# Patient Record
Sex: MALE | Race: WHITE
[De-identification: names, ages, dates, MRNs, and addresses within clinical notes are randomized per-mention and may not be internally consistent; named-entity substitution may affect disease eponyms.]

---

## 2017-06-29 ENCOUNTER — HOSPITAL ENCOUNTER (OUTPATIENT)
Dept: HOSPITAL 45 - C.LABSPEC | Age: 72
Discharge: HOME | End: 2017-06-29
Attending: PHYSICIAN ASSISTANT
Payer: COMMERCIAL

## 2017-06-29 DIAGNOSIS — M25.421: Primary | ICD-10-CM

## 2017-06-29 LAB
APPEARANCE SNV: (no result)
COLOR SNV: (no result)
SYNOVIAL FLUID MONONUC RELAT: 54.4 %
SYNOVIAL FLUID POLYNUC RELAT: 45.6 %

## 2017-08-11 ENCOUNTER — HOSPITAL ENCOUNTER (OUTPATIENT)
Dept: HOSPITAL 45 - C.LABBC | Age: 72
Discharge: HOME | End: 2017-08-11
Attending: UROLOGY
Payer: COMMERCIAL

## 2017-08-11 DIAGNOSIS — N52.9: Primary | ICD-10-CM

## 2017-08-11 DIAGNOSIS — N40.1: ICD-10-CM

## 2017-08-17 ENCOUNTER — HOSPITAL ENCOUNTER (OUTPATIENT)
Dept: HOSPITAL 45 - C.RADBC | Age: 72
Discharge: HOME | End: 2017-08-17
Attending: UROLOGY
Payer: COMMERCIAL

## 2017-08-17 DIAGNOSIS — N52.9: Primary | ICD-10-CM

## 2017-08-17 NOTE — DIAGNOSTIC IMAGING REPORT
K[facility]



CLINICAL HISTORY: Benign prostatic hypertrophy. Erectile dysfunction.



FINDINGS: 2 AP supine abdominal radiograph are compared to study dated

8/12/2016. There is a nonobstructed abdominal bowel gas pattern. No abnormal

abdominal calcifications are identified. Phleboliths are observed in the pelvis.

The skeletal structures are osteopenic. Mild lumbosacral spondylosis is

observed. The bony pelvis appears intact. Advanced arthritic change is seen in

the right hip.



IMPRESSION:



1. Nonobstructed abdominal bowel gas pattern.



2. There is no radiographic evidence of nephrolithiasis.







Electronically signed by:  Hernando Moreira M.D.

8/17/2017 3:59 PM



Dictated Date/Time:  8/17/2017 3:57 PM

## 2017-08-17 NOTE — CODING QUERY MEDICAL NECESSITY
CQSUPPORTING DIAGNOSIS NEEDED





A supporting diagnosis is required for the test/procedure performed on this patient in 
order for us to be reimbursed by the patient's insurance. Please provide a supporting 
diagnosis for the following test/procedure listed below next to the test name along with 
your signature. 



*If there is no additional diagnosis for this patient that would support the following 
test/procedure please document that below next to the test/procedure.



Test(s)/Procedure(s) that require a supporting diagnosis:







DOS  08/11/17    PROSTATE SPECIFIC ANTIGEN









Provider Signature:  ______________________________  Date:  _______



Thank you  

Ying Hawthorne

[employer]

Phone:  [phone]

Fax:  [phone]



Once completed, please kindly fax back to [phone]



For questions please call [phone]

## 2018-01-02 ENCOUNTER — HOSPITAL ENCOUNTER (OUTPATIENT)
Dept: HOSPITAL 45 - C.LABBC | Age: 73
Discharge: HOME | End: 2018-01-02
Attending: INTERNAL MEDICINE
Payer: COMMERCIAL

## 2018-01-02 DIAGNOSIS — E78.5: Primary | ICD-10-CM

## 2018-01-02 DIAGNOSIS — M16.9: ICD-10-CM

## 2018-01-02 DIAGNOSIS — N40.1: Primary | ICD-10-CM

## 2018-01-02 DIAGNOSIS — N40.1: ICD-10-CM

## 2018-01-02 DIAGNOSIS — N20.0: ICD-10-CM

## 2018-01-02 LAB
ALBUMIN SERPL-MCNC: 3.6 GM/DL (ref 3.4–5)
ALP SERPL-CCNC: 76 U/L (ref 45–117)
ALT SERPL-CCNC: 29 U/L (ref 12–78)
AST SERPL-CCNC: 20 U/L (ref 15–37)
BUN SERPL-MCNC: 22 MG/DL (ref 7–18)
CALCIUM SERPL-MCNC: 8.8 MG/DL (ref 8.5–10.1)
CO2 SERPL-SCNC: 29 MMOL/L (ref 21–32)
CREAT SERPL-MCNC: 1.28 MG/DL (ref 0.6–1.4)
GLUCOSE SERPL-MCNC: 93 MG/DL (ref 70–99)
KETONES UR QL STRIP: 136 MG/DL
PH UR: 206 MG/DL (ref 0–200)
POTASSIUM SERPL-SCNC: 3.9 MMOL/L (ref 3.5–5.1)
PROT SERPL-MCNC: 6.9 GM/DL (ref 6.4–8.2)
SODIUM SERPL-SCNC: 141 MMOL/L (ref 136–145)

## 2018-01-05 ENCOUNTER — HOSPITAL ENCOUNTER (OUTPATIENT)
Dept: HOSPITAL 45 - C.LABSPEC | Age: 73
Discharge: HOME | End: 2018-01-05
Attending: UROLOGY
Payer: COMMERCIAL

## 2018-01-05 DIAGNOSIS — R31.29: Primary | ICD-10-CM

## 2018-01-29 ENCOUNTER — HOSPITAL ENCOUNTER (OUTPATIENT)
Dept: HOSPITAL 45 - C.ULTR | Age: 73
Discharge: HOME | End: 2018-01-29
Attending: UROLOGY
Payer: COMMERCIAL

## 2018-01-29 DIAGNOSIS — N28.89: ICD-10-CM

## 2018-01-29 DIAGNOSIS — N28.1: ICD-10-CM

## 2018-01-29 DIAGNOSIS — N20.0: Primary | ICD-10-CM

## 2018-01-29 NOTE — DIAGNOSTIC IMAGING REPORT
RENAL ULTRASOUND



CLINICAL HISTORY: Nephrolithiasis.    



COMPARISON STUDY:  Renal ultrasound May 29, 2013, CT of the abdomen and pelvis

May 24, 2015 and KUB August 17, 2017.



TECHNIQUE:  Sonography of the kidneys and the urinary bladder was performed.



FINDINGS: The right kidney measures 11.4 cm in maximal dimension and the left

measures 11.6 cm. Note is made of a 4.5 cm cyst arising from the midpole of the

right kidney. No calculi are identified by sonography. There is no

hydronephrosis. A right-sided parapelvic cyst within the lower pole is noted.

There is moderate renal cortical thinning. Both ureteral jets were identified.



IMPRESSION: 



1. No hydronephrosis. No calculi identified by sonography.



2. 4.5 cm right renal cyst.



3. Moderate bilateral renal cortical thinning.







Electronically signed by:  Pablo Biggs M.D.

1/29/2018 10:25 AM



Dictated Date/Time:  1/29/2018 10:14 AM

## 2018-07-20 ENCOUNTER — HOSPITAL ENCOUNTER (OUTPATIENT)
Dept: HOSPITAL 45 - C.MRIBC | Age: 73
Discharge: HOME | End: 2018-07-20
Attending: INTERNAL MEDICINE
Payer: COMMERCIAL

## 2018-07-20 DIAGNOSIS — M54.16: Primary | ICD-10-CM

## 2018-07-20 NOTE — DIAGNOSTIC IMAGING REPORT
MRI OF THE LUMBAR SPINE WITHOUT CONTRAST



CLINICAL HISTORY: Back pain and left-sided radiculopathy.    



COMPARISON STUDY:  Lumbar spine radiographs July 5, 2018. 



TECHNIQUE:  Utilizing a 1.5 Dona magnet and dedicated coil, multiplanar,

multiecho imaging of the lumbar spine was performed without IV contrast.





FINDINGS:



For purposes of numbering on this exam, the L5-S1 disc space is assigned to

axial image 28 of 30. Note is made of a 4 mm anterolisthesis of L4 on L5 due to

facet arthrosis. Alignment is otherwise anatomic with the exception of slight

retrolisthesis of L2 on L3. There is no suspicious marrow replacement. Note is

made of a few hemangiomas within the lumbar spine, including a 1.7 cm hemangioma

within the L1 vertebral body. The conus terminates at the upper L1 level. Note

is made of a 1 x 0.8 cm T1 and T2 hyperintense abnormality within the right

aspect of the canal at the L3-L4 level which can indicates with the facet joint.

This reflects a synovial cyst. No additional intracanalicular lesions are

present. A right renal cyst is noted, as shown on ultrasound of January 29, 2018.



L1-2: The central canal and neural foramen are patent.



L2-3: There is moderate disc space narrowing with disc bulge, ligamentous

hypertrophy and facet arthrosis that result in mild narrowing of the central

canal and lateral recesses. There is minimal narrowing of the neural foramen.



L3-4: Note is made of disc bulge with moderate facet arthrosis. A 1 x 0.8 cm

synovial cyst within the right aspect of the canal is noted. There is mild to

moderate narrowing of the central canal and lateral recesses. Moderate bilateral

neural foraminal stenosis is noted.



L4-5: Grade I retrolisthesis is noted with uncovering the disc. There is severe

facet arthrosis. Central canal is patent. Moderate to severe left neural

foraminal stenosis is noted, predominantly due to facet arthrosis.



L5-S1: Facet arthrosis is present. Central canal and neural foramen are patent.





IMPRESSION: 



1. Severe multilevel facet arthrosis, most pronounced at L4-L5. Moderate to

severe left neural foraminal narrowing at this level and moderate bilateral

neural foraminal stenosis at L3-L4.



2. Mild to moderate central canal stenosis at L3-L4. No severe central canal

stenosis.



3. Small intracanalicular synovial cyst along the right aspect of the L3-L4

facet joint. 



4. No lumbar spine compression fracture.



5. Grade I anterolisthesis of L4 and L5 due to facet arthrosis.







Electronically signed by:  Pablo Biggs M.D.

7/20/2018 1:38 PM



Dictated Date/Time:  7/20/2018 1:27 PM